# Patient Record
Sex: MALE | Race: BLACK OR AFRICAN AMERICAN
[De-identification: names, ages, dates, MRNs, and addresses within clinical notes are randomized per-mention and may not be internally consistent; named-entity substitution may affect disease eponyms.]

---

## 2020-02-03 ENCOUNTER — HOSPITAL ENCOUNTER (EMERGENCY)
Dept: HOSPITAL 62 - ER | Age: 50
LOS: 1 days | Discharge: HOME | End: 2020-02-04
Payer: OTHER GOVERNMENT

## 2020-02-03 DIAGNOSIS — Z98.1: ICD-10-CM

## 2020-02-03 DIAGNOSIS — M54.16: ICD-10-CM

## 2020-02-03 DIAGNOSIS — I10: ICD-10-CM

## 2020-02-03 DIAGNOSIS — M48.061: Primary | ICD-10-CM

## 2020-02-03 LAB
ADD MANUAL DIFF: NO
ALBUMIN SERPL-MCNC: 4.3 G/DL (ref 3.5–5)
ALP SERPL-CCNC: 160 U/L (ref 38–126)
ANION GAP SERPL CALC-SCNC: 8 MMOL/L (ref 5–19)
APPEARANCE UR: (no result)
APTT PPP: YELLOW S
AST SERPL-CCNC: 24 U/L (ref 17–59)
BASOPHILS # BLD AUTO: 0.2 10^3/UL (ref 0–0.2)
BASOPHILS NFR BLD AUTO: 2.5 % (ref 0–2)
BILIRUB DIRECT SERPL-MCNC: 0 MG/DL (ref 0–0.4)
BILIRUB SERPL-MCNC: 0.5 MG/DL (ref 0.2–1.3)
BILIRUB UR QL STRIP: NEGATIVE
BUN SERPL-MCNC: 16 MG/DL (ref 7–20)
CALCIUM: 9.1 MG/DL (ref 8.4–10.2)
CHLORIDE SERPL-SCNC: 106 MMOL/L (ref 98–107)
CO2 SERPL-SCNC: 26 MMOL/L (ref 22–30)
EOSINOPHIL # BLD AUTO: 0.3 10^3/UL (ref 0–0.6)
EOSINOPHIL NFR BLD AUTO: 4.5 % (ref 0–6)
ERYTHROCYTE [DISTWIDTH] IN BLOOD BY AUTOMATED COUNT: 14.3 % (ref 11.5–14)
GLUCOSE SERPL-MCNC: 86 MG/DL (ref 75–110)
GLUCOSE UR STRIP-MCNC: NEGATIVE MG/DL
HCT VFR BLD CALC: 48 % (ref 37.9–51)
HGB BLD-MCNC: 16.5 G/DL (ref 13.5–17)
KETONES UR STRIP-MCNC: NEGATIVE MG/DL
LYMPHOCYTES # BLD AUTO: 2.5 10^3/UL (ref 0.5–4.7)
LYMPHOCYTES NFR BLD AUTO: 35.6 % (ref 13–45)
MCH RBC QN AUTO: 32.1 PG (ref 27–33.4)
MCHC RBC AUTO-ENTMCNC: 34.3 G/DL (ref 32–36)
MCV RBC AUTO: 94 FL (ref 80–97)
MONOCYTES # BLD AUTO: 0.6 10^3/UL (ref 0.1–1.4)
MONOCYTES NFR BLD AUTO: 8.2 % (ref 3–13)
NEUTROPHILS # BLD AUTO: 3.5 10^3/UL (ref 1.7–8.2)
NEUTS SEG NFR BLD AUTO: 49.2 % (ref 42–78)
NITRITE UR QL STRIP: NEGATIVE
PH UR STRIP: 5 [PH] (ref 5–9)
PLATELET # BLD: 190 10^3/UL (ref 150–450)
POTASSIUM SERPL-SCNC: 4.1 MMOL/L (ref 3.6–5)
PROT SERPL-MCNC: 7.9 G/DL (ref 6.3–8.2)
PROT UR STRIP-MCNC: NEGATIVE MG/DL
RBC # BLD AUTO: 5.13 10^6/UL (ref 4.35–5.55)
SP GR UR STRIP: 1.02
TOTAL CELLS COUNTED % (AUTO): 100 %
UROBILINOGEN UR-MCNC: 2 MG/DL (ref ?–2)
WBC # BLD AUTO: 7 10^3/UL (ref 4–10.5)

## 2020-02-03 PROCEDURE — 81001 URINALYSIS AUTO W/SCOPE: CPT

## 2020-02-03 PROCEDURE — 80053 COMPREHEN METABOLIC PANEL: CPT

## 2020-02-03 PROCEDURE — 72148 MRI LUMBAR SPINE W/O DYE: CPT

## 2020-02-03 PROCEDURE — 99283 EMERGENCY DEPT VISIT LOW MDM: CPT

## 2020-02-03 PROCEDURE — 96375 TX/PRO/DX INJ NEW DRUG ADDON: CPT

## 2020-02-03 PROCEDURE — 96376 TX/PRO/DX INJ SAME DRUG ADON: CPT

## 2020-02-03 PROCEDURE — 83690 ASSAY OF LIPASE: CPT

## 2020-02-03 PROCEDURE — 74177 CT ABD & PELVIS W/CONTRAST: CPT

## 2020-02-03 PROCEDURE — 85025 COMPLETE CBC W/AUTO DIFF WBC: CPT

## 2020-02-03 PROCEDURE — 36415 COLL VENOUS BLD VENIPUNCTURE: CPT

## 2020-02-03 PROCEDURE — 96374 THER/PROPH/DIAG INJ IV PUSH: CPT

## 2020-02-03 NOTE — ER DOCUMENT REPORT
ED General





- General


Chief Complaint: Back Pain


Stated Complaint: BACK PAIN


Time Seen by Provider: 02/03/20 17:31


TRAVEL OUTSIDE OF THE [place] IN LAST 30 DAYS: No





- HPI


Notes: 





Patient is a 49-year-old male with a history of multiple back surgeries, 

including lumbar spinal fusion, who presents to the emergency department for 

evaluation of progressively worsened back pain with radiculopathy, urinary 

urgency, and saddle paresthesias.  He states his pain is been progressively 

worse over the last month.  He denies any patrick incontinence, but states he is 

developed severe urinary urgency which is different.  He also complains of 

paresthesias in the perineal area.  He has had multiple injections, is under 

pain management in the past as well.  Pain is in his lower back, right greater 

than left, radiates down the right buttock, posterior thigh, and the lateral 

aspect of the right leg.  He describes a numbness and tingling in his great toe,

the lateral aspect of his right leg.





- Related Data


Allergies/Adverse Reactions: 


                                        





No Known Allergies Allergy (Unverified 02/03/20 17:32)


   








Home Medications: Tizanidine, myocarditis, Percocet





Past Medical History





- General


Information source: Patient





- Social History


Smoking Status: Never Smoker


Family History: Reviewed & Not Pertinent


Patient has suicidal ideation: No


Patient has homicidal ideation: No





- Past Medical History


Cardiac Medical History: Reports: Hx Hypertension


Pulmonary Medical History: Reports: Hx Asthma


Musculoskeletal Medical History: Reports Hx Musculoskeletal Trauma


Past Surgical History: Reports: Hx Orthopedic Surgery - spinal fusion lumbar and

cervical





Review of Systems





- Review of Systems


Constitutional: No symptoms reported


EENT: No symptoms reported


Cardiovascular: No symptoms reported


Respiratory: No symptoms reported


Gastrointestinal: No symptoms reported


Genitourinary: No symptoms reported


Musculoskeletal: See HPI


Skin: No symptoms reported


Neurological/Psychological: See HPI





Physical Exam





- Vital signs


Vitals: 


                                        











Temp Pulse Resp BP Pulse Ox


 


 98.4 F   86   18   152/93 H  97 


 


 02/03/20 17:22  02/03/20 17:22  02/03/20 17:22  02/03/20 17:22  02/03/20 17:22














- Notes


Notes: 





This is a very pleasant but obese 49-year-old male who appears his stated age, 

no acute distress.  Vital signs reviewed, please refer to chart. Head is 

normocephalic, atraumatic.  Pupils equal round, reactive to light.  Neck is 

supple without meningismus.  Heart is regular rate and rhythm.  Lungs are clear 

to auscultation bilaterally.  Abdomen is soft, nontender, normoactive bowel 

sounds throughout.  Extremities without cyanosis, clubbing.  Examination of 

spine is no midline tenderness or step-off.  He has paraspinal muscular 

tenderness throughout the lumbar spine.  Straight leg raise exam not attempted 

secondary to pain.  He has 3+ out of 5 strength on the right lower extremity, 4+

out of 5 on the left.  Patellar reflexes absent on the right lower extremity, +1

in the left lower extremity.  He is +2 Achilles reflexes bilaterally.  Sensation

is diminished on the right compared to left in regards to light touch 

throughout.





Course





- Re-evaluation


Re-evalutation: 





02/03/20 21:28


Patient presents to the emergency department for evaluation.  He states he has 

had some significant perineal numbness as well as urinary issues.  Given his 

extensive history I do believe it is appropriate to rule out cauda equina at 

this time.  MRI of the lumbar spine is ordered.  His lab work is unremarkable, 

including an unremarkable urinalysis.  He is given pain medicine.  We will 

continue to monitor.


02/04/20 01:47


Patient still complaining of pain, but his neurological deficits have not 

worsened.  I suspect his leg weakness is more secondary to pain.  He is 

medicated further.  I did discuss his MRI results, but at this time all that is 

identified is disc bulges, facet arthropathy, and likely fibrosis/scar tissue.  

We will send him home with steroids, small amount of pain medication.  We will 

refer him on for primary care follow-up, as he is just moved to the area, does 

not have a primary care physician.  He is to return to the ED with worsening or 

new concerning symptoms of any sort.





- Vital Signs


Vital signs: 


                                        











Temp Pulse Resp BP Pulse Ox


 


 98.5 F   72   20   125/73   97 


 


 02/03/20 20:38  02/03/20 20:38  02/03/20 20:38  02/03/20 20:38  02/03/20 20:38














- Laboratory


Result Diagrams: 


                                 02/03/20 18:39





                                 02/03/20 18:39


Laboratory results interpreted by me: 


                                        











  02/03/20 02/03/20 02/03/20





  18:30 18:39 18:39


 


RDW   14.3 H 


 


Baso % (Auto)   2.5 H 


 


Alkaline Phosphatase    160 H


 


Urine Urobilinogen  2.0 H  


 


Urine Ascorbic Acid  40 H  














- Diagnostic Test


Radiology reviewed: Reports reviewed


Radiology results interpreted by me: 





02/04/20 01:49





                                        





Abdomen/Pelvis CT  02/03/20 00:00


IMPRESSION:


 


No acute abnormality is identified.  


 


Nonobstructing renal calculi on the left


 


Postsurgical changes in the lumbar spine


 








Lumbar Spine MRI  02/03/20 21:20


IMPRESSION:Small amount of abnormal soft tissue surrounding the


thecal sac at the level of L5-S1 likely fibrosis. Postcontrast


imaging would be beneficial for further evaluation.


 


Mild narrowing of the central canal and neural foraminal stenosis


at L2-L3


 


Extensive postsurgical changes as described without significant


central canal narrowing elsewhere in the spine


 


 














Discharge





- Discharge


Clinical Impression: 


 Lumbar radiculopathy





Lumbar spinal stenosis


Qualifiers:


 Neurogenic claudication status: unspecified Qualified Code(s): M48.061 - Spinal

stenosis, lumbar region without neurogenic claudication





Condition: Stable


Disposition: HOME, SELF-CARE


Instructions:  Low Back Pain ([facility]), Radiculopathy ([facility])


Additional Instructions: 


Take medications as prescribed.  Watch for dizziness, drowsiness, constipation 

with the Percocet.  Follow-up with primary care soon as possible.  If you 

develop incontinence, worsened weakness, or any other new or concerning 

symptoms, please return immediately to the emergency department for evaluation.

## 2020-02-03 NOTE — RADIOLOGY REPORT (SQ)
EXAM DESCRIPTION: 



MR LUMBAR SPINE WITHOUT IV CONTRAST



COMPLETED DATE/TME:  02/03/2020 21:20



CLINICAL HISTORY:  49 years  Male  low back pain, urinary

urgency, saddle paresthesia



COMPARISON:  None.



TECHNIQUE:  Multiplanar and multisequence imaging obtained

through the lumbar spine without IV contrast.



FINDINGS:



Straightening of the normal lordosis.

There is anterior plate and screw fixation at L5-S1.

Intervertebral graft material at L3-4 and L4-5.

Pedicle screws at L3 and L4 with posterior fixation rods.

Loss of height and signal at L2-L3.

Conus ends at L1.

L2-L3 there is facet arthropathy and generalized bulging of the

disc which results in mild narrowing of the central canal and

bilateral neural foraminal stenosis.

L3-4: Bilateral neural foraminal narrowing without significant

central canal narrowing.

L4-5: Moderate right and mild left neural foraminal stenosis.

L5-S1: There is facet arthropathy with bilateral neural foraminal

stenosis.

Small amount of abnormal soft tissue surrounding the thecal sac

at the level of L5-S1 likely fibrosis. Postcontrast imaging would

be beneficial for further evaluation.



IMPRESSION:Small amount of abnormal soft tissue surrounding the

thecal sac at the level of L5-S1 likely fibrosis. Postcontrast

imaging would be beneficial for further evaluation.



Mild narrowing of the central canal and neural foraminal stenosis

at L2-L3



Extensive postsurgical changes as described without significant

central canal narrowing elsewhere in the spine

## 2020-02-03 NOTE — ER DOCUMENT REPORT
ED Medical Screen (RME)





- General


Chief Complaint: Back Pain


Stated Complaint: BACK PAIN


Time Seen by Provider: 02/03/20 17:31


Notes: 





HPI: 49-year-old male presenting to the emergency department complaining of 3 to

4 weeks of progressively worsening pain in the right lower back, right lower 

quadrant, right hip region.  Increased difficulty with walking.  Subjective 

fevers at home.  No dysuria.  Some occasional constipation no diarrhea.  Patient

states she has had multiple back surgeries in the past in the lumbar region.  

States he has a fusion L4-L5.  Patient was not sure of whether the hip was 

related to the back pain or different.  He states it hurts to walk or bend.  No 

definitive trauma in the last month to the hip region.





I have greeted and performed a rapid initial assessment of this patient.  A 

comprehensive ED assessment and evaluation of the patient, analysis of test 

results and completion of the medical decision making process will be conducted 

by additional ED providers








PHYSICAL EXAMINATION:





GENERAL: Well-appearing, well-nourished and in mild acute distress.


HEAD: Atraumatic, normocephalic.


EYES:  sclera anicteric, conjunctiva are normal.


ENT: Moist mucous membranes.


NECK: Normal range of motion


LUNGS: Normal work of breathing, lung sounds clear to auscultation


HEART: 2+ radial pulses bilaterally, regular rate and rhythm


ABD: limited by positioning for exam in triage.  Obese, moderate tenderness with

guarding in the right lower quadrant


EXTREMITIES: no pitting or edema.  No cyanosis.  Mild tenderness on palpation 

around the right hip girdle much more prominent through the right inguinal 

region.  Mild tenderness across the lower lumbar back on the right and centrally


NEUROLOGICAL: No focal neurological deficits. Moves all extremities 

spontaneously and on command.


PSYCH: Normal mood, normal affect.


SKIN: Warm, Dry, normal turgor, no rashes or lesions noted.








TRAVEL OUTSIDE OF THE [place] IN LAST 30 DAYS: No





- Related Data


Allergies/Adverse Reactions: 


                                        





No Known Allergies Allergy (Unverified 02/03/20 17:32)


   











Past Medical History


Past Surgical History: Reports: Hx Orthopedic Surgery - spinal fusion





Physical Exam





- Vital signs


Vitals: 





                                        











Temp Pulse Resp BP Pulse Ox


 


 98.4 F   86   18   152/93 H  97 


 


 02/03/20 17:22  02/03/20 17:22  02/03/20 17:22  02/03/20 17:22  02/03/20 17:22














Course





- Vital Signs


Vital signs: 





                                        











Temp Pulse Resp BP Pulse Ox


 


 98.4 F   86   18   152/93 H  97 


 


 02/03/20 17:22  02/03/20 17:22  02/03/20 17:22  02/03/20 17:22  02/03/20 17:22

## 2020-02-03 NOTE — RADIOLOGY REPORT (SQ)
EXAM DESCRIPTION: 



CT ABDOMEN PELVIS WITH IV CONTRAST



COMPLETED DATE/TME:  02/03/2020 00:00



CLINICAL HISTORY:  49 years  Male  diverticulitis



COMPARISON:  None.



TECHNIQUE:  Contiguous axial images obtained through the abdomen

and pelvis following IV contrast. Reformatted images obtained.  



This exam was performed according to our department optimization

program which includes automated exposure control, adjustment of

the mA and/or kv according to patient size and/or use of

iterative reconstruction technique.



FINDINGS:



The liver appears unremarkable.

The spleen and pancreas appear unremarkable.

No adrenal masses.



Nonobstructing renal calculi on the left.



The gallbladder is  visualized.



No aneurysmal dilatation of the aorta.



No bowel obstruction.  The appendix is unremarkable.



No significant free fluid noted.   There are postsurgical changes

in the lumbar spine. Fusion extends from L3 to S1.



IMPRESSION:



No acute abnormality is identified.  



Nonobstructing renal calculi on the left



Postsurgical changes in the lumbar spine

## 2020-02-04 VITALS — DIASTOLIC BLOOD PRESSURE: 87 MMHG | SYSTOLIC BLOOD PRESSURE: 114 MMHG
